# Patient Record
Sex: MALE | Race: BLACK OR AFRICAN AMERICAN | NOT HISPANIC OR LATINO | URBAN - METROPOLITAN AREA
[De-identification: names, ages, dates, MRNs, and addresses within clinical notes are randomized per-mention and may not be internally consistent; named-entity substitution may affect disease eponyms.]

---

## 2017-01-04 ENCOUNTER — OUTPATIENT (OUTPATIENT)
Dept: OUTPATIENT SERVICES | Facility: HOSPITAL | Age: 27
LOS: 1 days | End: 2017-01-04
Payer: MEDICAID

## 2017-01-04 DIAGNOSIS — D47.3 ESSENTIAL (HEMORRHAGIC) THROMBOCYTHEMIA: ICD-10-CM

## 2017-01-05 ENCOUNTER — APPOINTMENT (OUTPATIENT)
Dept: HEMATOLOGY ONCOLOGY | Facility: CLINIC | Age: 27
End: 2017-01-05

## 2017-01-05 ENCOUNTER — EMERGENCY (EMERGENCY)
Facility: HOSPITAL | Age: 27
LOS: 1 days | Discharge: HOME | End: 2017-01-05
Admitting: EMERGENCY MEDICINE
Payer: MEDICAID

## 2017-01-05 VITALS
HEART RATE: 85 BPM | WEIGHT: 165.32 LBS | DIASTOLIC BLOOD PRESSURE: 72 MMHG | SYSTOLIC BLOOD PRESSURE: 115 MMHG | RESPIRATION RATE: 16 BRPM | OXYGEN SATURATION: 99 % | TEMPERATURE: 98.1 F | BODY MASS INDEX: 25.9 KG/M2

## 2017-01-05 VITALS
HEART RATE: 99 BPM | RESPIRATION RATE: 18 BRPM | OXYGEN SATURATION: 100 % | TEMPERATURE: 98 F | SYSTOLIC BLOOD PRESSURE: 132 MMHG | DIASTOLIC BLOOD PRESSURE: 81 MMHG

## 2017-01-05 DIAGNOSIS — D47.3 ESSENTIAL (HEMORRHAGIC) THROMBOCYTHEMIA: ICD-10-CM

## 2017-01-05 PROCEDURE — 88313 SPECIAL STAINS GROUP 2: CPT | Mod: 26

## 2017-01-05 PROCEDURE — 99283 EMERGENCY DEPT VISIT LOW MDM: CPT

## 2017-01-05 PROCEDURE — 88360 TUMOR IMMUNOHISTOCHEM/MANUAL: CPT

## 2017-01-05 PROCEDURE — 88305 TISSUE EXAM BY PATHOLOGIST: CPT | Mod: 26

## 2017-01-05 PROCEDURE — 88313 SPECIAL STAINS GROUP 2: CPT

## 2017-01-05 PROCEDURE — 85097 BONE MARROW INTERPRETATION: CPT

## 2017-01-05 PROCEDURE — 88360 TUMOR IMMUNOHISTOCHEM/MANUAL: CPT | Mod: 26

## 2017-01-05 PROCEDURE — 88305 TISSUE EXAM BY PATHOLOGIST: CPT

## 2017-01-05 NOTE — ED PROVIDER NOTE - MEDICAL DECISION MAKING DETAILS
25 yo M pmhx of high platelets here for L buttock pain s/p bone marrow bx at [facility] with md simmons. Patient with mild tenderness over L buttock and slight L sided limp with walking but able to bear weight. Otherwise without complaints. No numbness/tingling. PLAN: call put out to [facility] on call doc for info on procedure and expectation for post-procedure pain, pain control in ED. 25 yo M pmhx of high platelets here for L buttock pain s/p bone marrow bx at [facility] with md simmons. Patient with mild tenderness over L buttock and slight L sided limp with walking but able to bear weight. Otherwise without complaints. No numbness/tingling. Pain appears to be very localized to insertion site from procedure, likely typical post-procedural pain. NO NEUROLOGICAL DEFICITS.  NO EVIDENCE OF INFECTION.  PLAN: call put out to [facility] on call doc for info on procedure and expectation for post-procedure pain, pain control in ED, warm/cold compresses, return precautions to patient.

## 2017-01-05 NOTE — ED PROVIDER NOTE - PHYSICAL EXAMINATION
MSK/Left Leg: two bandages on lower back/upper buttock on right and left with 1 cm insertion sites from procedure which appear clean, dry, intact, no bleeding swelling or erythema.  Sites bilaterally mildly tender to palpation L>R.  NO germain tenderness to hip or back. FROM of both lower extremities. No swelling. Mild tenderness to palpation of left upper buttock (where patient reports pain). NVI, sensate intact. Able to bear weight, walking with slight limp of L leg. MSK/Left Leg: two bandages on lower back/upper buttock on right and left with 1 cm insertion sites from procedure which appear clean, dry, intact, no bleeding swelling or erythema.  Sites bilaterally mildly tender to palpation L>R.  NO germain tenderness to hip or back. FROM of both lower extremities. No swelling. Mild tenderness to palpation of left upper buttock (where patient reports pain). NVI, sensate intact. Able to bear weight, walking with slight limp of L leg. Strength 5/5, no edema of extremity, sensation intact, palpable distal pulses, point to point movement intact, rapid alternating movements intact.

## 2017-01-05 NOTE — ED ADULT TRIAGE NOTE - CHIEF COMPLAINT QUOTE
Pt was receiving bone biopsy at [facility]. After procedure pt was co pain when standing. EMS states, pt was given "more lidocaine than normal for the procedure". Believes it may be causing a nerve block. Received 5mg IVP morphine by ems.

## 2017-01-05 NOTE — ED PROVIDER NOTE - OBJECTIVE STATEMENT
25 yo M with pmhx of increased platelets here with left leg/buttock pain x today. Patient reports he was at [facility] having a bone marrow bx for his "increased platelets". He states they originally went in through the left hip area but the MD performing the procedure said they had a difficult time at that site and have to switch to the right hip which went better. After the procedure patient states he had pain to the site and was given "a medication" in the office and then morphine by EMS.  States pain is sharp and achy, 8/10, hurts more with movement, less with rest. Denies numbness, tingling, swelling, fever, vomiting, diarrhea, cp, sob.

## 2017-01-05 NOTE — ED PROVIDER NOTE - PROGRESS NOTE DETAILS
ALMA Shaw: patient comfortable, pain likely normal post procedural pain, no response from [facility] on call doc but patient comfortable with plan for cold/warm compresses, tylenol for pain control, and follow up with MD Carlisle. Patient has FROM of leg, no evidence of infection, no neuro sxs. Patient education provided and follow up/return precautions given. ALMA De Santiago: I have personally seen and examined this patient. I have reviewed and addended the HPI, ROS, PE, and A/P as necessary. I agree with the above plan of care.

## 2017-01-05 NOTE — ED PROVIDER NOTE - PLAN OF CARE
You were seen for post procedural pain  Your site does not show any evidence of infection   Apply cold/warm compresses to area  Take tylenol every 4 hours as needed for pain  Follow up with Dr. Carlisle for reassessment  Follow up with PMD in 1-2 days   Return to ED if you feel like pain is worsening, not improving, fever, discharge, or other concerning symptoms

## 2017-01-05 NOTE — ED PROVIDER NOTE - CARE PLAN
Principal Discharge DX:	Buttock pain  Instructions for follow-up, activity and diet:	You were seen for post procedural pain  Your site does not show any evidence of infection   Apply cold/warm compresses to area  Take tylenol every 4 hours as needed for pain  Follow up with Dr. Carlisle for reassessment  Follow up with PMD in 1-2 days   Return to ED if you feel like pain is worsening, not improving, fever, discharge, or other concerning symptoms

## 2017-01-10 LAB — HEMATOPATHOLOGY REPORT: SIGNIFICANT CHANGE UP

## 2017-01-21 ENCOUNTER — RESULT REVIEW (OUTPATIENT)
Age: 27
End: 2017-01-21

## 2017-01-21 ENCOUNTER — RESULT CHARGE (OUTPATIENT)
Age: 27
End: 2017-01-21

## 2017-01-25 ENCOUNTER — APPOINTMENT (OUTPATIENT)
Dept: GASTROENTEROLOGY | Facility: CLINIC | Age: 27
End: 2017-01-25

## 2017-01-25 VITALS
DIASTOLIC BLOOD PRESSURE: 70 MMHG | BODY MASS INDEX: 26.21 KG/M2 | WEIGHT: 167 LBS | TEMPERATURE: 98.4 F | HEART RATE: 86 BPM | RESPIRATION RATE: 16 BRPM | OXYGEN SATURATION: 98 % | HEIGHT: 67 IN | SYSTOLIC BLOOD PRESSURE: 130 MMHG

## 2017-01-27 ENCOUNTER — MOBILE ON CALL (OUTPATIENT)
Age: 27
End: 2017-01-27

## 2017-01-30 ENCOUNTER — RX RENEWAL (OUTPATIENT)
Age: 27
End: 2017-01-30

## 2017-01-30 RX ORDER — ASPIRIN 81 MG/1
81 TABLET, CHEWABLE ORAL
Refills: 0 | Status: ACTIVE | COMMUNITY
Start: 2017-01-30

## 2018-12-23 ENCOUNTER — EMERGENCY (EMERGENCY)
Facility: HOSPITAL | Age: 28
LOS: 1 days | Discharge: HOME | End: 2018-12-23
Attending: EMERGENCY MEDICINE | Admitting: EMERGENCY MEDICINE
Payer: SELF-PAY

## 2018-12-23 VITALS
HEART RATE: 69 BPM | TEMPERATURE: 98 F | DIASTOLIC BLOOD PRESSURE: 84 MMHG | SYSTOLIC BLOOD PRESSURE: 118 MMHG | OXYGEN SATURATION: 100 % | RESPIRATION RATE: 18 BRPM

## 2018-12-23 LAB
ALBUMIN SERPL ELPH-MCNC: 4.4 G/DL — SIGNIFICANT CHANGE UP (ref 3.3–5)
ALP SERPL-CCNC: 124 U/L — HIGH (ref 40–120)
ALT FLD-CCNC: 20 U/L — SIGNIFICANT CHANGE UP (ref 4–41)
AST SERPL-CCNC: 30 U/L — SIGNIFICANT CHANGE UP (ref 4–40)
BASOPHILS # BLD AUTO: 0.02 K/UL — SIGNIFICANT CHANGE UP (ref 0–0.2)
BASOPHILS NFR BLD AUTO: 0.4 % — SIGNIFICANT CHANGE UP (ref 0–2)
BILIRUB SERPL-MCNC: 0.4 MG/DL — SIGNIFICANT CHANGE UP (ref 0.2–1.2)
BUN SERPL-MCNC: 14 MG/DL — SIGNIFICANT CHANGE UP (ref 7–23)
C3 SERPL-MCNC: 93.4 MG/DL — SIGNIFICANT CHANGE UP (ref 90–180)
C4 SERPL-MCNC: 16.3 MG/DL — SIGNIFICANT CHANGE UP (ref 10–40)
CALCIUM SERPL-MCNC: 9.7 MG/DL — SIGNIFICANT CHANGE UP (ref 8.4–10.5)
CHLORIDE SERPL-SCNC: 105 MMOL/L — SIGNIFICANT CHANGE UP (ref 98–107)
CO2 SERPL-SCNC: 26 MMOL/L — SIGNIFICANT CHANGE UP (ref 22–31)
CREAT SERPL-MCNC: 1.16 MG/DL — SIGNIFICANT CHANGE UP (ref 0.5–1.3)
EOSINOPHIL # BLD AUTO: 0.04 K/UL — SIGNIFICANT CHANGE UP (ref 0–0.5)
EOSINOPHIL NFR BLD AUTO: 0.7 % — SIGNIFICANT CHANGE UP (ref 0–6)
GLUCOSE SERPL-MCNC: 88 MG/DL — SIGNIFICANT CHANGE UP (ref 70–99)
HBA1C BLD-MCNC: 5.7 % — HIGH (ref 4–5.6)
HCT VFR BLD CALC: 42.4 % — SIGNIFICANT CHANGE UP (ref 39–50)
HGB BLD-MCNC: 13.7 G/DL — SIGNIFICANT CHANGE UP (ref 13–17)
IMM GRANULOCYTES # BLD AUTO: 0.01 # — SIGNIFICANT CHANGE UP
IMM GRANULOCYTES NFR BLD AUTO: 0.2 % — SIGNIFICANT CHANGE UP (ref 0–1.5)
LYMPHOCYTES # BLD AUTO: 1.94 K/UL — SIGNIFICANT CHANGE UP (ref 1–3.3)
LYMPHOCYTES # BLD AUTO: 35.3 % — SIGNIFICANT CHANGE UP (ref 13–44)
MCHC RBC-ENTMCNC: 27.8 PG — SIGNIFICANT CHANGE UP (ref 27–34)
MCHC RBC-ENTMCNC: 32.3 % — SIGNIFICANT CHANGE UP (ref 32–36)
MCV RBC AUTO: 86 FL — SIGNIFICANT CHANGE UP (ref 80–100)
MONOCYTES # BLD AUTO: 0.43 K/UL — SIGNIFICANT CHANGE UP (ref 0–0.9)
MONOCYTES NFR BLD AUTO: 7.8 % — SIGNIFICANT CHANGE UP (ref 2–14)
NEUTROPHILS # BLD AUTO: 3.05 K/UL — SIGNIFICANT CHANGE UP (ref 1.8–7.4)
NEUTROPHILS NFR BLD AUTO: 55.6 % — SIGNIFICANT CHANGE UP (ref 43–77)
NRBC # FLD: 0 — SIGNIFICANT CHANGE UP
PLATELET # BLD AUTO: 212 K/UL — SIGNIFICANT CHANGE UP (ref 150–400)
PMV BLD: 11.3 FL — SIGNIFICANT CHANGE UP (ref 7–13)
POTASSIUM SERPL-MCNC: 3.7 MMOL/L — SIGNIFICANT CHANGE UP (ref 3.5–5.3)
POTASSIUM SERPL-SCNC: 3.7 MMOL/L — SIGNIFICANT CHANGE UP (ref 3.5–5.3)
PROT SERPL-MCNC: 6.9 G/DL — SIGNIFICANT CHANGE UP (ref 6–8.3)
RBC # BLD: 4.93 M/UL — SIGNIFICANT CHANGE UP (ref 4.2–5.8)
RBC # FLD: 13.1 % — SIGNIFICANT CHANGE UP (ref 10.3–14.5)
SODIUM SERPL-SCNC: 142 MMOL/L — SIGNIFICANT CHANGE UP (ref 135–145)
WBC # BLD: 5.49 K/UL — SIGNIFICANT CHANGE UP (ref 3.8–10.5)
WBC # FLD AUTO: 5.49 K/UL — SIGNIFICANT CHANGE UP (ref 3.8–10.5)

## 2018-12-23 PROCEDURE — 99218: CPT

## 2018-12-23 RX ORDER — FAMOTIDINE 10 MG/ML
20 INJECTION INTRAVENOUS
Qty: 0 | Refills: 0 | Status: DISCONTINUED | OUTPATIENT
Start: 2018-12-23 | End: 2018-12-27

## 2018-12-23 RX ORDER — FAMOTIDINE 10 MG/ML
20 INJECTION INTRAVENOUS ONCE
Qty: 0 | Refills: 0 | Status: COMPLETED | OUTPATIENT
Start: 2018-12-23 | End: 2018-12-23

## 2018-12-23 RX ORDER — SODIUM CHLORIDE 9 MG/ML
1000 INJECTION INTRAMUSCULAR; INTRAVENOUS; SUBCUTANEOUS
Qty: 0 | Refills: 0 | Status: DISCONTINUED | OUTPATIENT
Start: 2018-12-23 | End: 2018-12-27

## 2018-12-23 RX ORDER — DIPHENHYDRAMINE HCL 50 MG
25 CAPSULE ORAL ONCE
Qty: 0 | Refills: 0 | Status: COMPLETED | OUTPATIENT
Start: 2018-12-23 | End: 2018-12-23

## 2018-12-23 RX ORDER — DIPHENHYDRAMINE HCL 50 MG
50 CAPSULE ORAL EVERY 6 HOURS
Qty: 0 | Refills: 0 | Status: DISCONTINUED | OUTPATIENT
Start: 2018-12-23 | End: 2018-12-24

## 2018-12-23 RX ORDER — DEXAMETHASONE 0.5 MG/5ML
10 ELIXIR ORAL ONCE
Qty: 0 | Refills: 0 | Status: COMPLETED | OUTPATIENT
Start: 2018-12-23 | End: 2018-12-23

## 2018-12-23 RX ORDER — DEXAMETHASONE 0.5 MG/5ML
10 ELIXIR ORAL EVERY 8 HOURS
Qty: 0 | Refills: 0 | Status: DISCONTINUED | OUTPATIENT
Start: 2018-12-23 | End: 2018-12-27

## 2018-12-23 RX ADMIN — Medication 102 MILLIGRAM(S): at 22:05

## 2018-12-23 RX ADMIN — Medication 10 MILLIGRAM(S): at 22:40

## 2018-12-23 RX ADMIN — FAMOTIDINE 20 MILLIGRAM(S): 10 INJECTION INTRAVENOUS at 12:29

## 2018-12-23 RX ADMIN — Medication 25 MILLIGRAM(S): at 12:28

## 2018-12-23 RX ADMIN — Medication 102 MILLIGRAM(S): at 12:28

## 2018-12-23 RX ADMIN — Medication 50 MILLIGRAM(S): at 18:54

## 2018-12-23 RX ADMIN — SODIUM CHLORIDE 100 MILLILITER(S): 9 INJECTION INTRAMUSCULAR; INTRAVENOUS; SUBCUTANEOUS at 15:37

## 2018-12-23 NOTE — ED PROVIDER NOTE - ATTENDING CONTRIBUTION TO CARE
I performed a face-to-face evaluation of the patient and performed a history and physical examination. I agree with the history and physical examination.    Zoltan: Likely angioedema ,not infectious. Treat w/ steroids, NSAIDs, Benadryl. ENT consult. CDU vs. admit.

## 2018-12-23 NOTE — ED ADULT TRIAGE NOTE - CHIEF COMPLAINT QUOTE
Pt states that he has had swelling to uvula since last night, cannot swallow his saliva, has difficulty speaking. Upon visualization significant uvula swelling seen.  Denies PMH

## 2018-12-23 NOTE — ED CDU PROVIDER INITIAL DAY NOTE - PHYSICAL EXAMINATION
ATTENDING PHYSICAL EXAM DR. Mayorga ***GEN - Sitting up on stretcher. Answers questions appropriately but difficulty speaking/hoarse voice.  Noticed to be spitting saliva ***HEAD - NC/AT ***EYES/NOSE - PERRL, EOMI, mucous membranes moist, no discharge ***THROAT: Severely swollen base of uvula with erythema with elongation of uvula sitting on tongue.  Swollen uvula causing obstruction of oral cavity.  ***NECK: Neck supple, non-tender without lymphadenopathy, no masses, no JVD.   ***PULMONARY - CTA b/l, symmetric breath sounds. ***CARDIAC -s1s2, RRR, no M,R,G  ***ABDOMEN - +BS, ND, NT, soft, no guarding, no rebound, no masses   ***BACK - no CVA tenderness, Normal  spine ***EXTREMITIES - symmetric pulses, 2+ dp, capillary refill < 2 seconds, no clubbing, no cyanosis, no edema ***SKIN - no rash or bruising   ***NEUROLOGIC - alert, CN 2-12 intact

## 2018-12-23 NOTE — ED CDU PROVIDER INITIAL DAY NOTE - OBJECTIVE STATEMENT
Mukesh - ED record reviewed.  I evaluated patient myself.  29 y/o M with hx uvulitis in past, attributed to GERD and sleep apnea.  States slept in his car last night and awoke with sore throat and difficulty swallowing secretions.  In ED noted to have severe uvula swelling.  ENT consult performed fiberoptic nasolaryngoscopy which did not show glottic swelling.  Patient received decadron and benadryl.  Transferred to CDU for continued observation and treatment.  Patient reports still having difficulty swallowing saliva.  Denies shortness of breathe.  No chest pain.  No fever.  Works as , but denies exposure to smoke or chemicals.

## 2018-12-23 NOTE — CONSULT NOTE ADULT - SUBJECTIVE AND OBJECTIVE BOX
"Zoltan: 28 M, no PMH, no allergies, no meds, awoke today with uvula swollen affecting swallowing. Was fine yesterday. Second time (happened 3 mos ago). Non-painful. No trouble breathing or neck pain or tooth pain. No F/C/CP. Had recent URI w/ fever 2 wks ago."    Pt says he woke up with swelling in uvula...       Exam  NAD, awake and alert  Breathing comfortably on RA  Face   Voice  Nose   OC/OP:   Neck    Fiberoptic laryngoscopy  NP wnl  BOT/vallecula  Epiglottis  AE folds  Arytenoids  TVC  post cricoid  Airway      A/P 28M with no sig pmh with uvula edema. Scope exam  -decadron 10mg q8 until symptoms improve (breathing comfortably, swallowing, tolerating PO)  -sleep study?  -ENT follow up? "Zoltan: 28 M, no PMH, no allergies, no meds, awoke today with uvula swollen affecting swallowing. Was fine yesterday. Second time (happened 3 mos ago). Non-painful. No trouble breathing or neck pain or tooth pain. No F/C/CP. Had recent URI w/ fever 2 wks ago."    Pt says he woke up with throat discomfort. Refusing to speak, unable to swallow spit. He fell asleep in his car last night. At baseline pt says he sleeps with mouth open and snores a lot. Pt says he is a current smoker. Denies any breathing difficulty.      Exam  NAD, awake and alert  Breathing comfortably on RA  Face symmetric  Voice: unable to assess  Nose clear b/l  OC/OP: uvula elongated and swollen  Neck soft/flat    Fiberoptic laryngoscopy  NP wnl  BOT/vallecula wnl  Epiglottis sharp  AE folds nonedematous  Arytenoids mobile, nonedematous  TVC visible, mobile bilaterally, no edema, no masses or lesions  Pooled secretions in pyriform sinus  Airway patent      A/P 28M with no sig pmh with uvula edema. Scope exam wnl.  -decadron 10mg q8 until symptoms improve (breathing comfortably, swallowing, tolerating PO)  -clear liquid diet, advance as tolerated  -no ENT intervention needed  -encourage PO hydration  -smoking cessation advised  -follow up with PMD re acid reflux  -dc home once tolerating PO

## 2018-12-23 NOTE — ED ADULT NURSE NOTE - OBJECTIVE STATEMENT
Receive pt in spot 4 alert and oriented x 3 c/o swelling to Uvula and difficulty swallowing.  Denies cough.  No stridor noted.  IV placed. Labs sent.  Meds given as per order

## 2018-12-23 NOTE — ED ADULT NURSE NOTE - NSIMPLEMENTINTERV_GEN_ALL_ED
Implemented All Universal Safety Interventions:  [place] to call system. Call bell, personal items and telephone within reach. Instruct patient to call for assistance. Room bathroom lighting operational. Non-slip footwear when patient is off stretcher. Physically safe environment: no spills, clutter or unnecessary equipment. Stretcher in lowest position, wheels locked, appropriate side rails in place.

## 2018-12-23 NOTE — ED PROVIDER NOTE - OBJECTIVE STATEMENT
Zlotan: 28 M, no PMH, no allergies, no meds, awoke today with uvula swollen affecting swallowing. Was fine yesterday. Second time (happened 3 mos ago). Non-painful. No trouble breathing or neck pain or tooth pain. No F/C/CP. Had recent URI w/ fever 2 wks ago. Zoltan: 28 M, no PMH, no allergies, no meds, awoke today with uvula swollen affecting swallowing. Was fine yesterday. Second time (happened 3 mos ago). Non-painful. No trouble breathing or neck pain or tooth pain. No F/C/CP. Had recent URI w/ fever 2 wks ago. Does snore, and falls asleep easily (slept in car): the repetitive trauma to uvula from snoring and awkward neck position may have caused the uvula edema. Smokes. Gets occasional heartburn Sx, which can also irritate uvula.

## 2018-12-23 NOTE — ED PROVIDER NOTE - MEDICAL DECISION MAKING DETAILS
Zoltan: Likely angioedema ,not infectious. Treat w/ steroids, NSAIDs, Benadryl. ENT consult. CDU vs. admit.

## 2018-12-23 NOTE — ED PROVIDER NOTE - ENMT, MLM
Airway patent, Mouth with normal mucosa. Throat has no vesicles, no oropharyngeal exudates and uvula is deep pink, swollen, elongated. Hot potato voice.

## 2018-12-23 NOTE — ED CDU PROVIDER INITIAL DAY NOTE - MEDICAL DECISION MAKING DETAILS
29 y/o M with uvulitis.  Severe swelling causing obstruction of oral cavity, however airway intact through nose per ENT nasolaryngoscopy.  Transferred to CDU for continued observation, IV decadron, benadry, NSAID.

## 2018-12-24 VITALS
DIASTOLIC BLOOD PRESSURE: 70 MMHG | HEART RATE: 64 BPM | TEMPERATURE: 98 F | RESPIRATION RATE: 16 BRPM | SYSTOLIC BLOOD PRESSURE: 119 MMHG | OXYGEN SATURATION: 99 %

## 2018-12-24 PROCEDURE — 99217: CPT

## 2018-12-24 RX ORDER — KETOROLAC TROMETHAMINE 30 MG/ML
15 SYRINGE (ML) INJECTION EVERY 8 HOURS
Qty: 0 | Refills: 0 | Status: COMPLETED | OUTPATIENT
Start: 2018-12-24 | End: 2018-12-29

## 2018-12-24 RX ADMIN — Medication 50 MILLIGRAM(S): at 03:16

## 2018-12-24 RX ADMIN — SODIUM CHLORIDE 100 MILLILITER(S): 9 INJECTION INTRAMUSCULAR; INTRAVENOUS; SUBCUTANEOUS at 03:18

## 2018-12-24 RX ADMIN — Medication 102 MILLIGRAM(S): at 07:00

## 2018-12-24 RX ADMIN — Medication 10 MILLIGRAM(S): at 07:34

## 2018-12-24 RX ADMIN — FAMOTIDINE 20 MILLIGRAM(S): 10 INJECTION INTRAVENOUS at 06:17

## 2018-12-24 NOTE — ED CDU PROVIDER SUBSEQUENT DAY NOTE - ENMT NEGATIVE STATEMENT, MLM
Ears: no ear pain and no hearing problems.Nose: no nasal congestion and no nasal drainage.Mouth/Throat: no dysphagia, no hoarseness and +throat pain.Neck: no lumps, no pain, no stiffness and no swollen glands

## 2018-12-24 NOTE — ED CDU PROVIDER DISPOSITION NOTE - NSFOLLOWUPINSTRUCTIONS_ED_ALL_ED_FT
follow up with Dr Vargas in 1 week call for appointment and time.  If symptoms worsen and or develop shortness of breath, difficulty breathing and or swallowing return to the ed immediately.  continue soft diet and advance as tolerated.

## 2018-12-24 NOTE — ED CDU PROVIDER DISPOSITION NOTE - CARE PROVIDER_API CALL
Jay Vargas), Otolaryngology  [address] [place] [address]  Phone: [phone]  Fax: [phone]

## 2018-12-24 NOTE — ED CDU PROVIDER SUBSEQUENT DAY NOTE - ENMT, MLM
Airway patent. Nasal mucosa clear. Mouth with normal mucosa. Throat has no vesicles, no oropharyngeal exudates and uvula is midline, elongated and inflammed

## 2018-12-24 NOTE — ED CDU PROVIDER SUBSEQUENT DAY NOTE - ATTENDING CONTRIBUTION TO CARE
agree with PA note  pt well appearing this morning; states when he presented felt uvula at distal end of tongue and now no longer feels it.  States has happened in past.  ENT saw pt and felt more inflammatory.  No fevers    PE: well appearing; VSS: HEENT: uvula not inflamed; long in size; no tonsillar exudates; CTAB/L    IMp: uvelitis now resolved; home on steroids; d/c home

## 2018-12-24 NOTE — ED CDU PROVIDER SUBSEQUENT DAY NOTE - MEDICAL DECISION MAKING DETAILS
27 yo M here for uvulitis. improving with meds. scoped by ENT, continue steroids, NSAIDS, benadryl/pepcid. No abx a this time. Pt afebrile, no white count, no exudates on exam. PLAN: meds, reassess.

## 2018-12-24 NOTE — ED CDU PROVIDER SUBSEQUENT DAY NOTE - PROGRESS NOTE DETAILS
resting comfortably in bed, offers no complaints.  airway patent, no difficulty breathing.  uvula midline and elongated.  pt states symptoms improving.  if tolerates po will d/c with steroids, abx and ent follow up with dr mario resting comfortably in bed, offers no complaints.  airway patent, no difficulty breathing.  uvula midline and elongated.  pt states symptoms improving.  if tolerates po will d/c with steroids and ent follow up with dr mario discussed with ent - does not require steroids for discharge discussed with ent - does not require steroids or abx for discharge tolerated diet ready for discharge with ent follow up in clinic

## 2018-12-28 LAB — C1INH FUNCTIONAL/C1INH TOTAL MFR SERPL: 30 MG/DL — SIGNIFICANT CHANGE UP (ref 21–39)

## 2020-01-02 NOTE — ED PROVIDER NOTE - NORMAL, MLM
Pt presents to ER with generalized pain under his arm pits and lower legs. Pt states he has history of HS and thinks he is having a flare up. Pt complaints of headache that started 2 days ago rated 9 out of 10. gabino all pertinent systems normal

## 2023-12-14 NOTE — CONSULT NOTE ADULT - CONSULT REASON
uvula swelling Alert-The patient is alert, awake and responds to voice. The patient is oriented to time, place, and person. The triage nurse is able to obtain subjective information.